# Patient Record
Sex: FEMALE | Race: WHITE | URBAN - METROPOLITAN AREA
[De-identification: names, ages, dates, MRNs, and addresses within clinical notes are randomized per-mention and may not be internally consistent; named-entity substitution may affect disease eponyms.]

---

## 2021-02-02 ENCOUNTER — OFFICE VISIT (OUTPATIENT)
Dept: URBAN - METROPOLITAN AREA CLINIC 118 | Facility: CLINIC | Age: 66
End: 2021-02-02

## 2021-02-10 ENCOUNTER — DASHBOARD ENCOUNTERS (OUTPATIENT)
Age: 66
End: 2021-02-10

## 2021-02-10 ENCOUNTER — OFFICE VISIT (OUTPATIENT)
Dept: URBAN - METROPOLITAN AREA CLINIC 118 | Facility: CLINIC | Age: 66
End: 2021-02-10
Payer: COMMERCIAL

## 2021-02-10 DIAGNOSIS — R04.2 HEMOPTYSIS: ICD-10-CM

## 2021-02-10 DIAGNOSIS — D64.89 ANEMIA DUE TO OTHER CAUSE: ICD-10-CM

## 2021-02-10 PROBLEM — 66857006: Status: ACTIVE | Noted: 2021-02-10

## 2021-02-10 PROBLEM — 271737000: Status: ACTIVE | Noted: 2021-02-10

## 2021-02-10 PROCEDURE — 99244 OFF/OP CNSLTJ NEW/EST MOD 40: CPT | Performed by: INTERNAL MEDICINE

## 2021-02-10 PROCEDURE — G8483 FLU IMM NO ADMIN DOC REA: HCPCS | Performed by: INTERNAL MEDICINE

## 2021-02-10 RX ORDER — ATORVASTATIN CALCIUM 10 MG/1
1 TABLET TABLET, FILM COATED ORAL ONCE A DAY
Status: ACTIVE | COMMUNITY

## 2021-02-10 RX ORDER — GLIPIZIDE 10 MG/1
1 TABLET WITH BREAKFAST TABLET, EXTENDED RELEASE ORAL ONCE A DAY
Status: ACTIVE | COMMUNITY

## 2021-02-10 RX ORDER — CITALOPRAM HYDROBROMIDE 20 MG/1
1 TABLET TABLET, FILM COATED ORAL ONCE A DAY
Status: ACTIVE | COMMUNITY

## 2021-02-10 RX ORDER — AMLODIPINE BESYLATE 10 MG/1
1 TABLET TABLET ORAL ONCE A DAY
Status: ACTIVE | COMMUNITY

## 2021-02-10 RX ORDER — METOROPROLOL TARTRATE 5 MG/5ML
AS DIRECTED INJECTION, SOLUTION INTRAVENOUS
Status: ACTIVE | COMMUNITY

## 2021-02-10 RX ORDER — ASPIRIN 81 MG/1
1 TABLET TABLET, COATED ORAL ONCE A DAY
Status: ACTIVE | COMMUNITY

## 2021-02-10 NOTE — HPI-TODAY'S VISIT:
64 yo WF referred by Dr. Bradshaw for anemia.  Pt has Hgb = 10.6, MCV = 87 on 12/3/20, with Hgb 11.5 on 10/6/20.  Pt has Cr ~ 1.0.  Pt has a hx of anemia 5-6 yrs ago with negative EGD/Colon approx  5 yrs ago.  She has had ongoing problems with anemia ever since. Lately, she has noted blood streaking sputum with coughing daily.  No SOB, CP, F/C/NS.No abd pain, N/V, weight loss.  No abd pain, N/V, weight loss.  BMs tid, always loose in AM.

## 2021-03-26 LAB
FERRITIN, SERUM: 21
HEMATOCRIT: 34.4
HEMOGLOBIN: 10.6
IRON BIND.CAP.(TIBC): 323
IRON SATURATION: 13
IRON: 43
MCH: 26.8
MCHC: 30.8
MCV: 87
NRBC: (no result)
PLATELETS: 222
RBC: 3.96
RDW: 14.6
UIBC: 280
VITAMIN B12: 213
WBC: 7.3

## 2021-03-31 ENCOUNTER — OFFICE VISIT (OUTPATIENT)
Dept: URBAN - METROPOLITAN AREA CLINIC 118 | Facility: CLINIC | Age: 66
End: 2021-03-31

## 2023-02-28 ENCOUNTER — OFFICE VISIT (OUTPATIENT)
Dept: URBAN - METROPOLITAN AREA CLINIC 118 | Facility: CLINIC | Age: 68
End: 2023-02-28